# Patient Record
Sex: MALE | Race: WHITE | Employment: OTHER | URBAN - METROPOLITAN AREA
[De-identification: names, ages, dates, MRNs, and addresses within clinical notes are randomized per-mention and may not be internally consistent; named-entity substitution may affect disease eponyms.]

---

## 2019-03-14 ENCOUNTER — TRANSFERRED RECORDS (OUTPATIENT)
Dept: HEALTH INFORMATION MANAGEMENT | Facility: CLINIC | Age: 84
End: 2019-03-14

## 2019-09-09 ENCOUNTER — TRANSFERRED RECORDS (OUTPATIENT)
Dept: HEALTH INFORMATION MANAGEMENT | Facility: CLINIC | Age: 84
End: 2019-09-09

## 2019-09-23 ENCOUNTER — ALLIED HEALTH/NURSE VISIT (OUTPATIENT)
Dept: NEUROLOGY | Facility: CLINIC | Age: 84
End: 2019-09-23
Payer: COMMERCIAL

## 2019-09-23 ENCOUNTER — OFFICE VISIT (OUTPATIENT)
Dept: NEUROLOGY | Facility: CLINIC | Age: 84
End: 2019-09-23
Payer: COMMERCIAL

## 2019-09-23 VITALS
DIASTOLIC BLOOD PRESSURE: 70 MMHG | TEMPERATURE: 97.7 F | WEIGHT: 176 LBS | SYSTOLIC BLOOD PRESSURE: 135 MMHG | HEART RATE: 63 BPM

## 2019-09-23 DIAGNOSIS — G40.109 FOCAL EPILEPSY (H): Primary | ICD-10-CM

## 2019-09-23 DIAGNOSIS — R56.9 SEIZURE-LIKE ACTIVITY (H): Primary | ICD-10-CM

## 2019-09-23 RX ORDER — ATORVASTATIN CALCIUM 80 MG/1
80 TABLET, FILM COATED ORAL
COMMUNITY
Start: 2019-02-07

## 2019-09-23 RX ORDER — ACETAMINOPHEN 500 MG
500-1000 TABLET ORAL
COMMUNITY

## 2019-09-23 RX ORDER — AMLODIPINE BESYLATE 10 MG/1
10 TABLET ORAL
COMMUNITY
Start: 2019-02-08

## 2019-09-23 RX ORDER — CLOPIDOGREL BISULFATE 75 MG/1
75 TABLET ORAL
COMMUNITY

## 2019-09-23 RX ORDER — FAMOTIDINE 20 MG/1
20 TABLET, FILM COATED ORAL
COMMUNITY
Start: 2019-02-08

## 2019-09-23 RX ORDER — ALBUTEROL SULFATE 0.83 MG/ML
2.5 SOLUTION RESPIRATORY (INHALATION)
COMMUNITY
Start: 2015-08-26

## 2019-09-23 RX ORDER — ECHINACEA PURPUREA EXTRACT 125 MG
2 TABLET ORAL
COMMUNITY

## 2019-09-23 RX ORDER — METOPROLOL SUCCINATE 100 MG/1
50 TABLET, EXTENDED RELEASE ORAL
COMMUNITY

## 2019-09-23 RX ORDER — LACOSAMIDE 50 MG/1
50 TABLET ORAL
COMMUNITY
Start: 2019-09-09 | End: 2019-09-23

## 2019-09-23 RX ORDER — ASPIRIN 81 MG/1
81 TABLET ORAL
COMMUNITY
Start: 2017-07-31

## 2019-09-23 RX ORDER — TERAZOSIN 10 MG/1
1 CAPSULE ORAL
COMMUNITY

## 2019-09-23 RX ORDER — ISOSORBIDE MONONITRATE 60 MG/1
60 TABLET, EXTENDED RELEASE ORAL
COMMUNITY
Start: 2019-06-16

## 2019-09-23 RX ORDER — LEVETIRACETAM 250 MG/1
250 TABLET ORAL 2 TIMES DAILY
Qty: 60 TABLET | Refills: 2 | Status: SHIPPED | OUTPATIENT
Start: 2019-09-23 | End: 2020-03-13

## 2019-09-23 RX ORDER — NITROGLYCERIN 0.4 MG/1
0.4 TABLET SUBLINGUAL
COMMUNITY

## 2019-09-23 RX ORDER — HYDROCODONE BITARTRATE AND ACETAMINOPHEN 5; 325 MG/1; MG/1
1 TABLET ORAL
COMMUNITY
Start: 2019-01-30

## 2019-09-23 RX ORDER — FLUTICASONE PROPIONATE 50 MCG
2 SPRAY, SUSPENSION (ML) NASAL
COMMUNITY

## 2019-09-23 RX ORDER — BUDESONIDE AND FORMOTEROL FUMARATE DIHYDRATE 160; 4.5 UG/1; UG/1
2 AEROSOL RESPIRATORY (INHALATION)
COMMUNITY

## 2019-09-23 NOTE — LETTER
2019       RE: Davie Ayoub  : 1929   MRN: [id number]      Dear Colleague,    Thank you for referring your patient, Davie Ayoub, to the [facility] EPILEPSY CARE at [facility]. Please see a copy of my visit note below.    [facility] Physical       Patient:  Davie Ayoub  :  1929   Age:  89 year old   Today's Office Visit:  2019    Referring Provider:    Jose Raul Pham MD  [facility]  [address] [place] [address]    History of Present Illness:    Davie Ayoub is a 89 yo male with history of multiple co-morbidities including prior CVA, L vertebral stenosis and R vertebral stent who presents for follow up after recent hospitalization for dysarthria. Patient states he woke up at 4am on  with discomfort and numbness in L arm. Wife woke up at 7am and says he had difficulty talking and his speech was garbled. Daughter says by 9am when she arrived, his speech was normal. He was following commands and was fully aware at the time. No loss of consciousness. No tongue biting. His MRI did not reveal an acute infarct. Given that he had a reported history of seizures, there was concern for TIA vs seizure. He was started on Vimpat 50 mg BID and discharged with plan for follow up in neuro clinic.  He has been having episodic speech issues for ~10 yrs now. He describes they last a few hours and fully resolve. Possibly associated with loss of balance (though this sounds more chronic and related to vertebral stenosis). He has had 2-3 episodes in 2019.  About a year ago, he had similar symptoms with difficulty speaking for 9-10 hours, after which he was confused/agitated.   He states the possibility of seizures was first mentioned in 2017 - he presented with dysarthria, generalized weakness and loss of balance which was thought to be a stroke so he was given TPA. His symptoms fully resolved and his MRI did not show  any acute infarct. He was initially discharged but returned with similar symptoms and was admitted to [place]. There was concern for seizures as he reported having similar prior episodes for few years. He was placed on continuous EEG at that time - which revealed mild slowing in R posterior region but no events were recorded.  He was ultimately discharged on keppra 750 BID. Patient reports this medication was stopped due to excessive sleepiness.  Epilepsy Risk Factors:  No meningitis, childhood seizures, family history of epilepsy, or other major risk factors. No recent trauma; though he states 5 yrs ago he had a fall and hit his head - no LOC. History of right occipital stroke.   Precipitating factors:   NONE    Social History     Socioeconomic History     Marital status:      Spouse name: None     Number of children: None     Years of education: None     Highest education level: None   Occupational History     None   Social Needs     Financial resource strain: None     Food insecurity:     Worry: None     Inability: None     Transportation needs:     Medical: None     Non-medical: None   Tobacco Use     Smoking status: Never Smoker     Smokeless tobacco: Never Used   Substance and Sexual Activity     Alcohol use: None     Drug use: None     Sexual activity: None   Lifestyle     Physical activity:     Days per week: None     Minutes per session: None     Stress: None   Relationships     Social connections:     Talks on phone: None     Gets together: None     Attends Rastafari service: None     Active member of club or organization: None     Attends meetings of clubs or organizations: None     Relationship status: None     Intimate partner violence:     Fear of current or ex partner: None     Emotionally abused: None     Physically abused: None     Forced sexual activity: None   Other Topics Concern     Parent/sibling w/ CABG, MI or angioplasty before 65F 55M? Not Asked   Social History Narrative     None      Past Medical History:   1. Right occipital stroke.  2. Macular degeneration.  3. BPH  4. Essential HTN  5. CAD  7. HLD  8. NSTEMI  9. L4/L5 laminectomy  Employment/School:  Retired  Driving:  Currently patient is:  Driving: Patient was made aware of state driving laws. Currently meets criteria to continue to drive.    Previous Evaluations for Epilepsy:   EEG (7/30/17 at OSH):  Background: Background activity during wakefulness reveals a 9-10Hz posterior dominant rhythm. It appears symmetrical, synchronous, and reactive to eye opening. Lower amplitude frequencies are noted over the frontal fields creating an appropriate anterior-posterior amplitude-frequency gradient. Mild slowing is seen in right posterior (Max P8) region, especially during drowsiness.   Sleep: Stage I and stage II of sleep were identified. Stage II sleep is notable for symmetric sleep spindles, vertex sharp transients, and K complexes. Slow wave sleep was also seen, and is characterized by a mixture of delta and theta frequencies.   EKG: A normal sinus rhythm is noted with an estimated heart rate of 66-72 bpm.  Ictal/Interictal Findings: None.  Impression: Mildly abnormal - Mild right posterior slowing  Clinical Correlation: This EEG demonstrates focal abnormalities of the right posterior region. Focal abnormalities imply localized cortical dysfunction and may be related to structural, vascular, or other epileptogenic pathologies. In this case, likely secondary to patient's known old stroke. Clinical correlation is recommended.  EEG (9/23/19 prior to visit): Some focal epilepiform discharges in L temporal region with generalized slowing.  MRA Head (9/9/19):  Atheromatous change and mild narrowing posterior cerebral arteries, right greater than left but no significant vessel occlusion seen. Anterior circulation unremarkable.  MRA Neck (9/9/19):  1. Left vertebral artery is only faintly visualized segmentally and suspect multilevel stenoses.  2.  Possible stenosis origin right vertebral artery. Remainder of the right vertebral artery is unremarkable.  3. No significant stenosis in the anterior circulation in the neck. Mild atheromatous change.  MRI of Brain (9/9/19): Nothing acute. Old right occipital lobe infarct, mild chronic small vessel ischemic disease, and minimal atrophy.  Review of Systems:  Lethargy / Tiredness:  No  Nausea / Vomiting:  No  Double Vision:  No  Sleepiness:  No  Depression:  No  Slowed Cognitive Function:  No  Memory Problems:  No  Poor Balance:  Yes  Dizziness:  Yes  Appetite Changes:  No  Blurred Vision:  No  Decreased Libido:  No  Sleep Changes:  No  Behavioral Changes:  No  Skin: negative  Respiratory: No cough, or hemoptysis  Cardiovascular: negative  Have you experienced a traumatic fall related to your events: No  Are these falls related to your seizures: No    Current Outpatient Medications   Medication Sig Dispense Refill     acetaminophen (TYLENOL) 500 MG tablet Take 500-1,000 mg by mouth       albuterol (PROVENTIL) (2.5 MG/3ML) 0.083% neb solution Inhale 2.5 mg into the lungs       amLODIPine (NORVASC) 10 MG tablet Take 10 mg by mouth       aspirin 81 MG EC tablet Take 81 mg by mouth       atorvastatin (LIPITOR) 80 MG tablet Take 80 mg by mouth       budesonide-formoterol (SYMBICORT) 160-4.5 MCG/ACT Inhaler Inhale 2 puffs into the lungs       clopidogrel (PLAVIX) 75 MG tablet Take 75 mg by mouth       famotidine (PEPCID) 20 MG tablet Take 20 mg by mouth       fluticasone (FLONASE) 50 MCG/ACT nasal spray Spray 2 sprays in nostril       HYDROcodone-acetaminophen (NORCO) 5-325 MG tablet Take 1 tablet by mouth       isosorbide mononitrate (IMDUR) 60 MG 24 hr tablet Take 60 mg by mouth       levETIRAcetam (KEPPRA) 250 MG tablet Take 1 tablet (250 mg) by mouth 2 times daily 60 tablet 2     metoprolol succinate ER (TOPROL-XL) 100 MG 24 hr tablet Take 50 mg by mouth       nitroGLYcerin (NITROSTAT) 0.4 MG sublingual tablet Place 0.4  mg under the tongue       sodium chloride (OCEAN) 0.65 % nasal spray Spray 2 sprays in nostril       terazosin (HYTRIN) 10 MG capsule Take 1 capsule by mouth         Perceived AED Side Effects: No    Medication Notes:   AED Medication Compliance:  compliant all of the time  Using a pill box:  No    Past AEDs:  Keppra 750 BID - made him drowsy    Exam:    /70 (BP Location: Right arm, Patient Position: Chair, Cuff Size: Adult Regular)   Pulse 63   Temp 97.7  F (36.5  C)   Wt 79.8 kg (176 lb)      Wt Readings from Last 5 Encounters:   09/23/19 79.8 kg (176 lb)   General: Normal male, not in acute distress  HEENT: NC/AT, no nasal drainage, no oral ulcers  Chest: No respiratory distress  Heart: Regular rate  Extremities: Missing fingers in R hand, warm, well perfused  Neuro:  -MS: alert and oriented, speech fluent and coherent  -CN: VFF, EOMI with smooth pursuit, facial sensation and movement intact, hearing intact to conversation, palate raises symmetrically, shoulder shrug strong bilaterally, tongue midline  -Motor: No abnormal movements. Tone normal. Strength 5/5 throughout bilateral upper/lower extremities.  -Reflexes: Normoactive and symmetric at achilles, patella, biceps, brachioradialis.  -Sensory: Decreased to light touch in RLE, otherwise equal and intact.  -Coordination: FNF and HS intact bilaterally  -Gait: Uses a walker, not formally tested    Assessment and Plan:  This is a 89 yo male with history of multiple co-morbidities including prior CVA, L vertebral stenosis and R vertebral stent who presents with multiple prior episodes of dysarthria, lasting hours. When seen in the hospital, concern for TIA vs seizure. In 2017, admitted for similar symptoms with continuous EEG revealing mild R posterior slowing. EEG today reveals epileptiform discharges in L temporal lobe. At this time, based on his EEG, he is at risk for seizures, though we did not capture any spells to determine if his spells are  seizures. Spells lasting hours is not entirely typical for seizures. However, will recommend he continue AEDs. Unfortunately, it seems his coverage will be limited with Vimpat. He had tried keppra 750 BID in the past but stated it made him very sleepy. He is agreeable to taking keppra 250 BID instead - which will be covered by insurance.    - Taper Vimpat to off.  - Start Keppra 250 BID. If he is still feeling tired after 1-2 weeks, can take Keppra 500 at night only.  - Obtain Keppra level in 2 weeks for efficacy, will do adjustments accordingly.   - Follow up in 2 months.    Patient was seen and discussed with attending, Dr. Núñez.    Mary Salazar MD  Neurology PGY2    I saw and evaluated Mr. Davie Ayoub and discussed the plan of care with the resident. I have reviewed her note and agree with the findings, impression and plan as edited above. Patient's presentation with gradual progressing symptoms from left arm numbness to difficulty talking over hours is unusual for seizures. Symptoms were more likely to be stroke/TIAs and the patient has risk factors for stroke and has h/o right occipital stroke. However, patient's EEG shows rare left temporal (T3/T5) epileptiform discharges. Therefore, the patient was recommended to continue an antiepileptic medication. Due to the limited coverage for Vimpat it was decided to switch to low dose Keppra.     Velma Swann MD          Again, thank you for allowing me to participate in the care of your patient.      Sincerely,    Velma Swann MD

## 2019-09-23 NOTE — PROGRESS NOTES
CPT: 16102-80  [facility]  [facility]  Dr Wilton escalante

## 2019-09-23 NOTE — PROGRESS NOTES
[facility] and Physical       Patient:  Davie Ayoub  :  1929   Age:  89 year old   Today's Office Visit:  2019    Referring Provider:    Jose Raul Pham MD  [place] [address] [place] [address]    History of Present Illness:    Davie Ayoub is a 89 yo male with history of multiple co-morbidities including prior CVA, L vertebral stenosis and R vertebral stent who presents for follow up after recent hospitalization for dysarthria. Patient states he woke up at 4am on  with discomfort and numbness in L arm. Wife woke up at 7am and says he had difficulty talking and his speech was garbled. Daughter says by 9am when she arrived, his speech was normal. He was following commands and was fully aware at the time. No loss of consciousness. No tongue biting. His MRI did not reveal an acute infarct. Given that he had a reported history of seizures, there was concern for TIA vs seizure. He was started on Vimpat 50 mg BID and discharged with plan for follow up in neuro clinic.  He has been having episodic speech issues for ~10 yrs now. He describes they last a few hours and fully resolve. Possibly associated with loss of balance (though this sounds more chronic and related to vertebral stenosis). He has had 2-3 episodes in 2019.  About a year ago, he had similar symptoms with difficulty speaking for 9-10 hours, after which he was confused/agitated.   He states the possibility of seizures was first mentioned in 2017 - he presented with dysarthria, generalized weakness and loss of balance which was thought to be a stroke so he was given TPA. His symptoms fully resolved and his MRI did not show any acute infarct. He was initially discharged but returned with similar symptoms and was admitted to [place]. There was concern for seizures as he reported having similar prior episodes for few years. He was placed on continuous EEG at that time - which revealed mild  slowing in R posterior region but no events were recorded.  He was ultimately discharged on keppra 750 BID. Patient reports this medication was stopped due to excessive sleepiness.  Epilepsy Risk Factors:  No meningitis, childhood seizures, family history of epilepsy, or other major risk factors. No recent trauma; though he states 5 yrs ago he had a fall and hit his head - no LOC. History of right occipital stroke.   Precipitating factors:   NONE    Social History     Socioeconomic History     Marital status:      Spouse name: None     Number of children: None     Years of education: None     Highest education level: None   Occupational History     None   Social Needs     Financial resource strain: None     Food insecurity:     Worry: None     Inability: None     Transportation needs:     Medical: None     Non-medical: None   Tobacco Use     Smoking status: Never Smoker     Smokeless tobacco: Never Used   Substance and Sexual Activity     Alcohol use: None     Drug use: None     Sexual activity: None   Lifestyle     Physical activity:     Days per week: None     Minutes per session: None     Stress: None   Relationships     Social connections:     Talks on phone: None     Gets together: None     Attends Orthodox service: None     Active member of club or organization: None     Attends meetings of clubs or organizations: None     Relationship status: None     Intimate partner violence:     Fear of current or ex partner: None     Emotionally abused: None     Physically abused: None     Forced sexual activity: None   Other Topics Concern     Parent/sibling w/ CABG, MI or angioplasty before 65F 55M? Not Asked   Social History Narrative     None     Past Medical History:   1. Right occipital stroke.  2. Macular degeneration.  3. BPH  4. Essential HTN  5. CAD  7. HLD  8. NSTEMI  9. L4/L5 laminectomy  Employment/School:  Retired  Driving:  Currently patient is:  Driving: Patient was made aware of state driving  laws. Currently meets criteria to continue to drive.    Previous Evaluations for Epilepsy:   EEG (7/30/17 at OSH):  Background: Background activity during wakefulness reveals a 9-10Hz posterior dominant rhythm. It appears symmetrical, synchronous, and reactive to eye opening. Lower amplitude frequencies are noted over the frontal fields creating an appropriate anterior-posterior amplitude-frequency gradient. Mild slowing is seen in right posterior (Max P8) region, especially during drowsiness.   Sleep: Stage I and stage II of sleep were identified. Stage II sleep is notable for symmetric sleep spindles, vertex sharp transients, and K complexes. Slow wave sleep was also seen, and is characterized by a mixture of delta and theta frequencies.   EKG: A normal sinus rhythm is noted with an estimated heart rate of 66-72 bpm.  Ictal/Interictal Findings: None.  Impression: Mildly abnormal - Mild right posterior slowing  Clinical Correlation: This EEG demonstrates focal abnormalities of the right posterior region. Focal abnormalities imply localized cortical dysfunction and may be related to structural, vascular, or other epileptogenic pathologies. In this case, likely secondary to patient's known old stroke. Clinical correlation is recommended.  EEG (9/23/19 prior to visit): Some focal epilepiform discharges in L temporal region with generalized slowing.  MRA Head (9/9/19):  Atheromatous change and mild narrowing posterior cerebral arteries, right greater than left but no significant vessel occlusion seen. Anterior circulation unremarkable.  MRA Neck (9/9/19):  1. Left vertebral artery is only faintly visualized segmentally and suspect multilevel stenoses.  2. Possible stenosis origin right vertebral artery. Remainder of the right vertebral artery is unremarkable.  3. No significant stenosis in the anterior circulation in the neck. Mild atheromatous change.  MRI of Brain (9/9/19): Nothing acute. Old right occipital lobe  infarct, mild chronic small vessel ischemic disease, and minimal atrophy.  Review of Systems:  Lethargy / Tiredness:  No  Nausea / Vomiting:  No  Double Vision:  No  Sleepiness:  No  Depression:  No  Slowed Cognitive Function:  No  Memory Problems:  No  Poor Balance:  Yes  Dizziness:  Yes  Appetite Changes:  No  Blurred Vision:  No  Decreased Libido:  No  Sleep Changes:  No  Behavioral Changes:  No  Skin: negative  Respiratory: No cough, or hemoptysis  Cardiovascular: negative  Have you experienced a traumatic fall related to your events: No  Are these falls related to your seizures: No    Current Outpatient Medications   Medication Sig Dispense Refill     acetaminophen (TYLENOL) 500 MG tablet Take 500-1,000 mg by mouth       albuterol (PROVENTIL) (2.5 MG/3ML) 0.083% neb solution Inhale 2.5 mg into the lungs       amLODIPine (NORVASC) 10 MG tablet Take 10 mg by mouth       aspirin 81 MG EC tablet Take 81 mg by mouth       atorvastatin (LIPITOR) 80 MG tablet Take 80 mg by mouth       budesonide-formoterol (SYMBICORT) 160-4.5 MCG/ACT Inhaler Inhale 2 puffs into the lungs       clopidogrel (PLAVIX) 75 MG tablet Take 75 mg by mouth       famotidine (PEPCID) 20 MG tablet Take 20 mg by mouth       fluticasone (FLONASE) 50 MCG/ACT nasal spray Spray 2 sprays in nostril       HYDROcodone-acetaminophen (NORCO) 5-325 MG tablet Take 1 tablet by mouth       isosorbide mononitrate (IMDUR) 60 MG 24 hr tablet Take 60 mg by mouth       levETIRAcetam (KEPPRA) 250 MG tablet Take 1 tablet (250 mg) by mouth 2 times daily 60 tablet 2     metoprolol succinate ER (TOPROL-XL) 100 MG 24 hr tablet Take 50 mg by mouth       nitroGLYcerin (NITROSTAT) 0.4 MG sublingual tablet Place 0.4 mg under the tongue       sodium chloride (OCEAN) 0.65 % nasal spray Spray 2 sprays in nostril       terazosin (HYTRIN) 10 MG capsule Take 1 capsule by mouth         Perceived AED Side Effects: No    Medication Notes:   AED Medication Compliance:  compliant all of  the time  Using a pill box:  No    Past AEDs:  Keppra 750 BID - made him drowsy    Exam:    /70 (BP Location: Right arm, Patient Position: Chair, Cuff Size: Adult Regular)   Pulse 63   Temp 97.7  F (36.5  C)   Wt 79.8 kg (176 lb)      Wt Readings from Last 5 Encounters:   09/23/19 79.8 kg (176 lb)   General: Normal male, not in acute distress  HEENT: NC/AT, no nasal drainage, no oral ulcers  Chest: No respiratory distress  Heart: Regular rate  Extremities: Missing fingers in R hand, warm, well perfused  Neuro:  -MS: alert and oriented, speech fluent and coherent  -CN: VFF, EOMI with smooth pursuit, facial sensation and movement intact, hearing intact to conversation, palate raises symmetrically, shoulder shrug strong bilaterally, tongue midline  -Motor: No abnormal movements. Tone normal. Strength 5/5 throughout bilateral upper/lower extremities.  -Reflexes: Normoactive and symmetric at achilles, patella, biceps, brachioradialis.  -Sensory: Decreased to light touch in RLE, otherwise equal and intact.  -Coordination: FNF and HS intact bilaterally  -Gait: Uses a walker, not formally tested    Assessment and Plan:  This is a 89 yo male with history of multiple co-morbidities including prior CVA, L vertebral stenosis and R vertebral stent who presents with multiple prior episodes of dysarthria, lasting hours. When seen in the hospital, concern for TIA vs seizure. In 2017, admitted for similar symptoms with continuous EEG revealing mild R posterior slowing. EEG today reveals epileptiform discharges in L temporal lobe. At this time, based on his EEG, he is at risk for seizures, though we did not capture any spells to determine if his spells are seizures. Spells lasting hours is not entirely typical for seizures. However, will recommend he continue AEDs. Unfortunately, it seems his coverage will be limited with Vimpat. He had tried keppra 750 BID in the past but stated it made him very sleepy. He is agreeable to  taking keppra 250 BID instead - which will be covered by insurance.    - Taper Vimpat to off.  - Start Keppra 250 BID. If he is still feeling tired after 1-2 weeks, can take Keppra 500 at night only.  - Obtain Keppra level in 2 weeks for efficacy, will do adjustments accordingly.   - Follow up in 2 months.    Patient was seen and discussed with attending, Dr. Núñez.    Mary Salazar MD  Neurology PGY2    I saw and evaluated Mr. Davie Ayoub and discussed the plan of care with the resident. I have reviewed her note and agree with the findings, impression and plan as edited above. Patient's presentation with gradual progressing symptoms from left arm numbness to difficulty talking over hours is unusual for seizures. Symptoms were more likely to be stroke/TIAs and the patient has risk factors for stroke and has h/o right occipital stroke. However, patient's EEG shows rare left temporal (T3/T5) epileptiform discharges. Therefore, the patient was recommended to continue an antiepileptic medication. Due to the limited coverage for Vimpat it was decided to switch to low dose Keppra.     Velma Swann MD

## 2019-10-02 NOTE — PROCEDURES
Procedure Date: 2019      EEG #:  [id number].      DATE OF STUDY:  2019 .      SOURCE FILE DURATION:  3 hours 12 minutes 53 seconds.      CLINICAL SUMMARY:  The patient is a 89-year-old male with episodes of aphasia and left arm numbness.  EEG was performed to evaluate for seizures.     TECHNICAL SUMMARY: This continuous video- EEG monitoring procedure was performed with 23 scalp electrodes in 10-20 electrode system placement, and additional scalp, precordial and other surface electrodes used for electrical referencing and artifact detection.  Video monitoring was utilized and periodically reviewed by EEG technologists and the physician for electroclinical correlations.     INTERICTAL ACTIVITIES:  During quiet wakefulness, there was poorly sustained 7 Hz activity over the posterior head regions which was symmetric and reactive.  Diffuse theta slowing was present during waking.  Vertex waves and symmetric sleep spindles were seen during sleep.  Rare sharp waves were present over the left posterior temporal region, maximal at T3-T5.      ACTIVATION MANEUVERS:  Photic stimulation did not induce an abnormal activity on the EEG.      CLINICAL/ICTAL EVENTS:  No electrographic or clinical seizures were recorded.      IMPRESSION:  This is an abnormal video EEG due to the presence of diffuse theta slowing during waking, consistent with mild diffuse nonspecific encephalopathy.  There were also rare epileptiform discharges over the left mid-posterior temporal region consistent with an area of irritability and tendency for seizures.  No electrographic or clinical seizures were recorded.         GRISEL NESS MD             D: 10/01/2019   T: 10/01/2019   MT: al      Name:     ADELINA CERDA   MRN:      [id number]        Account:        [id number]   :      1929           Procedure Date: 2019      Document: [id number]

## 2019-11-11 ENCOUNTER — OFFICE VISIT (OUTPATIENT)
Dept: NEUROLOGY | Facility: CLINIC | Age: 84
End: 2019-11-11
Payer: COMMERCIAL

## 2019-11-11 VITALS — WEIGHT: 180.8 LBS | HEART RATE: 60 BPM | SYSTOLIC BLOOD PRESSURE: 128 MMHG | DIASTOLIC BLOOD PRESSURE: 84 MMHG

## 2019-11-11 DIAGNOSIS — R40.4 NONSPECIFIC PAROXYSMAL SPELL: Primary | ICD-10-CM

## 2019-11-11 RX ORDER — LACOSAMIDE 100 MG/1
TABLET ORAL
Qty: 45 TABLET | Refills: 5 | Status: SHIPPED | OUTPATIENT
Start: 2019-11-11

## 2019-11-11 NOTE — LETTER
"2019       RE: Davie Ayoub  : 1929   MRN: [id number]      Dear Colleague,    Thank you for referring your patient, Davie Ayoub, to the [facility] EPILEPSY CARE at [facility]. Please see a copy of my visit note below.    [facility] Epilepsy Care Progress Note      Patient:  Davie Ayoub  :  1929   Age:  89 year old   Today's Office Visit:  2019    History of Present Illness:   Davie Ayoub is a 89yo M w/ pmh co-mobidties including prior CVA, L vertebral stenosis and R vertebral stent who was last seen in Neurology Clinic by Dr. Núñez on 2019 for episodes of garbled, dysarthric speech for ~10yrs, last  up to a few hours and fully resolve. He has had previous workup w/ EEGs showing R posterior slowing and most recent study revealed epileptiform discharges from L temporal lobe. Given  EEG abnormalities and ambiguity in history, patient was considered at risk for seizures and therefore was to continue on ASDs. Please see previous notes for more information.     Interval History:  Patient had not tolerated keppra previously due to lethargy and was on Vimpat, although this was seemingly not going to be covered so the plan at last visit was to continue Keppra at a lower dose. Today, patient presents with wife and daughter; they say that Vimpat was ultimately approved and has uptitrated to 100mg BID for about a month now. Has not had any more spells in this time but does say that he's been sleepy all the time, doesn't have \"the balance as before\" and was considering quitting color guard because of these symptoms. Correlates these symptoms with increasing vimpat to 100 BID but denies symptoms at 50mg BID. Denies falls, doesn't use a cane or walker, although wife thinks he should. Uses a pill box, never misses a dose.     Other events - wife says that 2 weeks ago he was \"slurring, moaning, groaning\" in his sleep; no other associated seizure-like symptoms. Woke " up and was talking normal. Reports he gets light headed when he stands up too fast or when he turns his head to quick.       Current Outpatient Medications   Medication Sig Dispense Refill     acetaminophen (TYLENOL) 500 MG tablet Take 500-1,000 mg by mouth       albuterol (PROVENTIL) (2.5 MG/3ML) 0.083% neb solution Inhale 2.5 mg into the lungs       amLODIPine (NORVASC) 10 MG tablet Take 10 mg by mouth       aspirin 81 MG EC tablet Take 81 mg by mouth       atorvastatin (LIPITOR) 80 MG tablet Take 80 mg by mouth       budesonide-formoterol (SYMBICORT) 160-4.5 MCG/ACT Inhaler Inhale 2 puffs into the lungs       clopidogrel (PLAVIX) 75 MG tablet Take 75 mg by mouth       famotidine (PEPCID) 20 MG tablet Take 20 mg by mouth       fluticasone (FLONASE) 50 MCG/ACT nasal spray Spray 2 sprays in nostril       HYDROcodone-acetaminophen (NORCO) 5-325 MG tablet Take 1 tablet by mouth       isosorbide mononitrate (IMDUR) 60 MG 24 hr tablet Take 60 mg by mouth       levETIRAcetam (KEPPRA) 250 MG tablet Take 1 tablet (250 mg) by mouth 2 times daily 60 tablet 2     metoprolol succinate ER (TOPROL-XL) 100 MG 24 hr tablet Take 50 mg by mouth       nitroGLYcerin (NITROSTAT) 0.4 MG sublingual tablet Place 0.4 mg under the tongue       sodium chloride (OCEAN) 0.65 % nasal spray Spray 2 sprays in nostril       terazosin (HYTRIN) 10 MG capsule Take 1 capsule by mouth          Medication Notes:        AED Medication Compliance:  compliant all of the time  Using a pill box:  Yes    Review of Systems:  10pt review of system performed and negative besides what was listed in HPI.    Exam:    /84 (BP Location: Left arm, Patient Position: Sitting, Cuff Size: Adult Regular)   Pulse 60   Wt 180 lb 12.8 oz (82 kg)      Wt Readings from Last 5 Encounters:   11/11/19 180 lb 12.8 oz (82 kg)   09/23/19 176 lb (79.8 kg)     General Appearance: Normal  Gait: few steps unassisted appears unstable, deferred rest of eval.  Attention Span:   Normal  Language:  Normal  Pupils: unequal, R>>L, L minimally reactive, no convincing constriction of R w/ ambient light.   Extraocular Movements:  Normal  Coordination:  Normal  Visual Fields:  Normal, question of upper fields but likely 2/2 drooping eyelid and not VF-related.  Facial Sensation:  Normal  Facial Strength: R nasolabial flattening/smile asymmetry. L eyelid drooping.  Tongue Strength:  Normal  Limb Strength:  Normal  Limb Tone:  Normal  Limb Sensation:  Normal except for decreased sensation on RLE.   Reflexes: Brisk in RUE compared to L, more notable than RLE > LLE.   General Physical Findings:  Normal  CV: Regular rate  Pulm: CTAB  Abd: NT/ND, BS+  Extremities: Missing fingers in R hand, warm, well perfused    Assessment and Plan:   89 yo male with history of multiple co-morbidities including prior CVA, L vertebral stenosis and R vertebral stent who presents with multiple prior episodes of dysarthria, lasting hours. Differential includes vascular phenomena in setting of known intracerebral pathology, medication side effect, seizure. Episodes as described would be atypical of seizures. Alternatively, given ambiguity in reported history and EEG 9/2019 revealing epileptiform discharges in L temporal lobe and therefore susceptibility for seizures, it may be appropriate to continue ASDs for the time being. Will continue Vimpat at a lower dose to optimize treatment while attempting to minimize side effects.    Recommendations:  - Continue Vimpat    - check level after 1 week  - f/u 4 months    Patient was seen and discussed with my attending, Dr. Núñez, who agrees with the assessment and plan as described as above.    Asim Dennis MD on 11/11/2019 at 4:39 PM      I saw and evaluated the patient and discussed the plan of care with Dr. Dennis. I have reviewed his note and agree with the findings, impression and plan.   Velma Swann MD                      Again, thank you for allowing me  to participate in the care of your patient.      Sincerely,    Velma Swann MD

## 2019-11-11 NOTE — PROGRESS NOTES
"[facility]      Patient:  Davie Ayoub  :  1929   Age:  89 year old   Today's Office Visit:  2019    History of Present Illness:   Davie Ayoub is a 89yo M w/ pmh co-mobidties including prior CVA, L vertebral stenosis and R vertebral stent who was last seen in Neurology Clinic by Dr. Núñez on 2019 for episodes of garbled, dysarthric speech for ~10yrs, last  up to a few hours and fully resolve. He has had previous workup w/ EEGs showing R posterior slowing and most recent study revealed epileptiform discharges from L temporal lobe. Given  EEG abnormalities and ambiguity in history, patient was considered at risk for seizures and therefore was to continue on ASDs. Please see previous notes for more information.     Interval History:  Patient had not tolerated keppra previously due to lethargy and was on Vimpat, although this was seemingly not going to be covered so the plan at last visit was to continue Keppra at a lower dose. Today, patient presents with wife and daughter; they say that Vimpat was ultimately approved and has uptitrated to 100mg BID for about a month now. Has not had any more spells in this time but does say that he's been sleepy all the time, doesn't have \"the balance as before\" and was considering quitting color guard because of these symptoms. Correlates these symptoms with increasing vimpat to 100 BID but denies symptoms at 50mg BID. Denies falls, doesn't use a cane or walker, although wife thinks he should. Uses a pill box, never misses a dose.     Other events - wife says that 2 weeks ago he was \"slurring, moaning, groaning\" in his sleep; no other associated seizure-like symptoms. Woke up and was talking normal. Reports he gets light headed when he stands up too fast or when he turns his head to quick.       Current Outpatient Medications   Medication Sig Dispense Refill     acetaminophen (TYLENOL) 500 MG tablet Take 500-1,000 mg by mouth       " albuterol (PROVENTIL) (2.5 MG/3ML) 0.083% neb solution Inhale 2.5 mg into the lungs       amLODIPine (NORVASC) 10 MG tablet Take 10 mg by mouth       aspirin 81 MG EC tablet Take 81 mg by mouth       atorvastatin (LIPITOR) 80 MG tablet Take 80 mg by mouth       budesonide-formoterol (SYMBICORT) 160-4.5 MCG/ACT Inhaler Inhale 2 puffs into the lungs       clopidogrel (PLAVIX) 75 MG tablet Take 75 mg by mouth       famotidine (PEPCID) 20 MG tablet Take 20 mg by mouth       fluticasone (FLONASE) 50 MCG/ACT nasal spray Spray 2 sprays in nostril       HYDROcodone-acetaminophen (NORCO) 5-325 MG tablet Take 1 tablet by mouth       isosorbide mononitrate (IMDUR) 60 MG 24 hr tablet Take 60 mg by mouth       levETIRAcetam (KEPPRA) 250 MG tablet Take 1 tablet (250 mg) by mouth 2 times daily 60 tablet 2     metoprolol succinate ER (TOPROL-XL) 100 MG 24 hr tablet Take 50 mg by mouth       nitroGLYcerin (NITROSTAT) 0.4 MG sublingual tablet Place 0.4 mg under the tongue       sodium chloride (OCEAN) 0.65 % nasal spray Spray 2 sprays in nostril       terazosin (HYTRIN) 10 MG capsule Take 1 capsule by mouth          Medication Notes:        AED Medication Compliance:  compliant all of the time  Using a pill box:  Yes    Review of Systems:  10pt review of system performed and negative besides what was listed in HPI.    Exam:    /84 (BP Location: Left arm, Patient Position: Sitting, Cuff Size: Adult Regular)   Pulse 60   Wt 180 lb 12.8 oz (82 kg)      Wt Readings from Last 5 Encounters:   11/11/19 180 lb 12.8 oz (82 kg)   09/23/19 176 lb (79.8 kg)     General Appearance: Normal  Gait: few steps unassisted appears unstable, deferred rest of eval.  Attention Span:  Normal  Language:  Normal  Pupils: unequal, R>>L, L minimally reactive, no convincing constriction of R w/ ambient light.   Extraocular Movements:  Normal  Coordination:  Normal  Visual Fields:  Normal, question of upper fields but likely 2/2 drooping eyelid and not  VF-related.  Facial Sensation:  Normal  Facial Strength: R nasolabial flattening/smile asymmetry. L eyelid drooping.  Tongue Strength:  Normal  Limb Strength:  Normal  Limb Tone:  Normal  Limb Sensation:  Normal except for decreased sensation on RLE.   Reflexes: Brisk in RUE compared to L, more notable than RLE > LLE.   General Physical Findings:  Normal  CV: Regular rate  Pulm: CTAB  Abd: NT/ND, BS+  Extremities: Missing fingers in R hand, warm, well perfused    Assessment and Plan:   89 yo male with history of multiple co-morbidities including prior CVA, L vertebral stenosis and R vertebral stent who presents with multiple prior episodes of dysarthria, lasting hours. Differential includes vascular phenomena in setting of known intracerebral pathology, medication side effect, seizure. Episodes as described would be atypical of seizures. Alternatively, given ambiguity in reported history and EEG 9/2019 revealing epileptiform discharges in L temporal lobe and therefore susceptibility for seizures, it may be appropriate to continue ASDs for the time being. Will continue Vimpat at a lower dose to optimize treatment while attempting to minimize side effects.    Recommendations:  - Continue Vimpat    - check level after 1 week  - f/u 4 months    Patient was seen and discussed with my attending, Dr. Núñez, who agrees with the assessment and plan as described as above.    Asim Dennis MD on 11/11/2019 at 4:39 PM      I saw and evaluated the patient and discussed the plan of care with Dr. Dennis. I have reviewed his note and agree with the findings, impression and plan.   Velma Swann MD

## 2020-03-13 ENCOUNTER — OFFICE VISIT (OUTPATIENT)
Dept: NEUROLOGY | Facility: CLINIC | Age: 85
End: 2020-03-13
Payer: COMMERCIAL

## 2020-03-13 VITALS
SYSTOLIC BLOOD PRESSURE: 128 MMHG | DIASTOLIC BLOOD PRESSURE: 59 MMHG | OXYGEN SATURATION: 97 % | HEART RATE: 72 BPM | WEIGHT: 181.5 LBS

## 2020-03-13 DIAGNOSIS — R40.4 NONSPECIFIC PAROXYSMAL SPELL: ICD-10-CM

## 2020-03-13 DIAGNOSIS — G40.109 FOCAL EPILEPSY (H): Primary | ICD-10-CM

## 2020-03-13 PROCEDURE — 99213 OFFICE O/P EST LOW 20 MIN: CPT | Performed by: PSYCHIATRY & NEUROLOGY

## 2020-03-13 ASSESSMENT — PAIN SCALES - GENERAL: PAINLEVEL: NO PAIN (0)

## 2020-03-13 NOTE — NURSING NOTE
Davie Ayoub's goals for this visit include:   Chief Complaint   Patient presents with     RECHECK     4 month follow up, accompanied by wife Ava       He requests these members of his care team be copied on today's visit information: no    PCP: Fior Nick    Referring Provider:  Velma Swann MD  [address] [place] [address]    /59 (BP Location: Left arm, Patient Position: Sitting, Cuff Size: Adult Regular)   Pulse 72   Wt 82.3 kg (181 lb 8 oz)   SpO2 97%     Do you need any medication refills at today's visit? no

## 2020-03-13 NOTE — LETTER
3/13/2020         RE: Davie Ayoub  [address] [address]        Dear Colleague,    Thank you for referring your patient, Davie Ayoub, to the [facility]. Please see a copy of my visit note below.     NEUROLOGY PROGRESS NOTE   [facility]    Patient:Davie Ayoub  : 1929  Age: 89 year old  Today's Office Visit: 2020    Background History:     Davie Ayoub is a 89yo M w/ pmh co-mobidties including prior CVA, L vertebral stenosis and R vertebral stent who was last seen on 2019 for episodes of garbled, dysarthric speech for ~10yrs, last  up to a few hours and fully resolve. He has had previous workup w/ EEGs showing R posterior slowing and most recent study revealed epileptiform discharges from L temporal lobe. Given  EEG abnormalities and ambiguity in history, patient was considered at risk for seizures and therefore was to continue on ASDs. Keppra caused too much sleepiness, so it was discontinued. Please see previous notes for more information.     History of present illness:     Neal is accompanied by his wife for a follow up on his seizures. He is taking lacosamide 50 mg am and 100 mg pm. He was very sleepy and had imbalance on Vimpat 100 mg bid. They did not notice any seizures since last visit in 2019. His wife says he dreams and talks and moves around in his sleep. He wakes up afterward and when she talks to him, he speaks fine and is not confused.    He saw his cardiologist yesterday and was told everything looked good.     Current Outpatient Medications   Medication Sig Dispense Refill     acetaminophen (TYLENOL) 500 MG tablet Take 500-1,000 mg by mouth       albuterol (PROVENTIL) (2.5 MG/3ML) 0.083% neb solution Inhale 2.5 mg into the lungs       amLODIPine (NORVASC) 10 MG tablet Take 10 mg by mouth       aspirin 81 MG EC tablet Take 81 mg by mouth       atorvastatin (LIPITOR) 80 MG tablet Take 80 mg by mouth       budesonide-formoterol (SYMBICORT)  160-4.5 MCG/ACT Inhaler Inhale 2 puffs into the lungs       clopidogrel (PLAVIX) 75 MG tablet Take 75 mg by mouth       famotidine (PEPCID) 20 MG tablet Take 20 mg by mouth       fluticasone (FLONASE) 50 MCG/ACT nasal spray Spray 2 sprays in nostril       HYDROcodone-acetaminophen (NORCO) 5-325 MG tablet Take 1 tablet by mouth       isosorbide mononitrate (IMDUR) 60 MG 24 hr tablet Take 60 mg by mouth       Lacosamide (VIMPAT) 100 MG TABS tablet Take 1/2 tab in the morning and 1 tab in the evening 45 tablet 5     levETIRAcetam (KEPPRA) 250 MG tablet Take 1 tablet (250 mg) by mouth 2 times daily 60 tablet 2     metoprolol succinate ER (TOPROL-XL) 100 MG 24 hr tablet Take 50 mg by mouth       nitroGLYcerin (NITROSTAT) 0.4 MG sublingual tablet Place 0.4 mg under the tongue       sodium chloride (OCEAN) 0.65 % nasal spray Spray 2 sprays in nostril       terazosin (HYTRIN) 10 MG capsule Take 1 capsule by mouth       Review of Systems:    Lethargy / Tiredness:  No  Sleepiness:  No  Nausea / Vomiting:  No  Blurred Vision:  Yes  Double Vision:  No  Depression:  No  Anxiety: Yes  Memory Problems:  Yes  Poor Balance:  Yes  Dizziness:  Yes  Appetite Changes:  No  Sleep Changes:  No  Headache: yes  Behavioral Changes:  No  Rash: negative  Respiratory: No shortness of breath and No cough  Cardiovascular: negative  Have you experienced a traumatic fall related to your events: No    Exam:    /59 (BP Location: Left arm, Patient Position: Sitting, Cuff Size: Adult Regular)   Pulse 72   Wt 82.3 kg (181 lb 8 oz)   SpO2 97%      Wt Readings from Last 5 Encounters:   03/13/20 82.3 kg (181 lb 8 oz)   11/11/19 82 kg (180 lb 12.8 oz)   09/23/19 79.8 kg (176 lb)     General Appearance: alert, awake, cooperative, NAD  NEUROLOGICAL EXAM:  Gait: slowly walks with a walker   Language/speech: no aphasia or dysarthria  Extraocular Movements:  intact  Coordination: intact  Facial Strength:  Left eyelid drooping  Tongue Strength:   Normal  Limb Strength:  Normal tone, bulk and strength 5/5, missing fingers in right hand  Limb Sensation:  Decreased sensation to light touch RLE    Assessment/Plan:    Focal epilepsy: h/o right occipital stroke, right vertebral artery stenosis, s/p stent, significant left vertebral artery stenosis. He did not any seizures since started on ASD. Sleepiness and imbalance is better off Keppra and a on lower dose of Vimpat. Will obtain Vimpat level for efficacy.     - Continue Vimpat     - Obtain Vimpat level     - RTC in 6 months      As described above, I met with the patient for 20 minutes and during this time counseling was greater than 50% of the visit time.  Velma Swann MD

## 2020-03-16 ENCOUNTER — TRANSFERRED RECORDS (OUTPATIENT)
Dept: HEALTH INFORMATION MANAGEMENT | Facility: CLINIC | Age: 85
End: 2020-03-16

## 2020-03-16 LAB
ALT SERPL-CCNC: 22 U/L (ref 0–55)
AST SERPL-CCNC: 20 U/L (ref 5–40)
CHOLEST SERPL-MCNC: 125 MG/DL
CREAT SERPL-MCNC: 1.1 MG/DL (ref 0.5–1.5)
GFR SERPL CREATININE-BSD FRML MDRD: >60 ML/MIN/1.73M2
GLUCOSE SERPL-MCNC: 96 MG/DL (ref 70–180)
HDLC SERPL-MCNC: 55 MG/DL
LDLC SERPL CALC-MCNC: 45 MG/DL
POTASSIUM SERPL-SCNC: 3.9 MMOL/L (ref 3.5–5)
TRIGL SERPL-MCNC: 125 MG/DL

## 2020-04-06 ENCOUNTER — TELEPHONE (OUTPATIENT)
Dept: NEUROLOGY | Facility: CLINIC | Age: 85
End: 2020-04-06

## 2020-04-06 NOTE — TELEPHONE ENCOUNTER
Health Call Center    Phone Message    May a detailed message be left on voicemail: yes     Reason for Call: Rozina from the [facility] called wanting to discuss if labs were done at the patient's last visit and if you wanted their most recent labs. Please advise. Thank you.    Action Taken: Message routed to:  Adult Clinics: Neurology p [id number]    Travel Screening: Not Applicable

## 2020-04-06 NOTE — TELEPHONE ENCOUNTER
[facility] called the [facility] and spoke to Rozina. She wasn't sure if we had received the Lacosamide level that was drawn on 3/10/20. After review of the chart, it looks like Dr Núñez ordered a level back on 3/13/20 but it was never drawn.   She did indicate that the results of the Lacosamide level drawn on 3/10/20 was 5.2.   She will have these results faxed to the clinic so we can get them entered into his chart.  Encounter routed to Dr Núñez as an FYI.  Maggy Galeana, RNCC  Neurology

## 2022-02-14 NOTE — PROGRESS NOTES
NEUROLOGY PROGRESS NOTE   [facility]    Patient:Davie Ayoub  : 1929  Age: 89 year old  Today's Office Visit: 2020    Background History:     Davie Ayoub is a 89yo M w/ pmh co-mobidties including prior CVA, L vertebral stenosis and R vertebral stent who was last seen on 2019 for episodes of garbled, dysarthric speech for ~10yrs, last  up to a few hours and fully resolve. He has had previous workup w/ EEGs showing R posterior slowing and most recent study revealed epileptiform discharges from L temporal lobe. Given  EEG abnormalities and ambiguity in history, patient was considered at risk for seizures and therefore was to continue on ASDs. Keppra caused too much sleepiness, so it was discontinued. Please see previous notes for more information.     History of present illness:     Neal is accompanied by his wife for a follow up on his seizures. He is taking lacosamide 50 mg am and 100 mg pm. He was very sleepy and had imbalance on Vimpat 100 mg bid. They did not notice any seizures since last visit in 2019. His wife says he dreams and talks and moves around in his sleep. He wakes up afterward and when she talks to him, he speaks fine and is not confused.    He saw his cardiologist yesterday and was told everything looked good.     Current Outpatient Medications   Medication Sig Dispense Refill     acetaminophen (TYLENOL) 500 MG tablet Take 500-1,000 mg by mouth       albuterol (PROVENTIL) (2.5 MG/3ML) 0.083% neb solution Inhale 2.5 mg into the lungs       amLODIPine (NORVASC) 10 MG tablet Take 10 mg by mouth       aspirin 81 MG EC tablet Take 81 mg by mouth       atorvastatin (LIPITOR) 80 MG tablet Take 80 mg by mouth       budesonide-formoterol (SYMBICORT) 160-4.5 MCG/ACT Inhaler Inhale 2 puffs into the lungs       clopidogrel (PLAVIX) 75 MG tablet Take 75 mg by mouth       famotidine (PEPCID) 20 MG tablet Take 20 mg by mouth       fluticasone (FLONASE) 50 MCG/ACT nasal spray Spray 2  sprays in nostril       HYDROcodone-acetaminophen (NORCO) 5-325 MG tablet Take 1 tablet by mouth       isosorbide mononitrate (IMDUR) 60 MG 24 hr tablet Take 60 mg by mouth       Lacosamide (VIMPAT) 100 MG TABS tablet Take 1/2 tab in the morning and 1 tab in the evening 45 tablet 5     levETIRAcetam (KEPPRA) 250 MG tablet Take 1 tablet (250 mg) by mouth 2 times daily 60 tablet 2     metoprolol succinate ER (TOPROL-XL) 100 MG 24 hr tablet Take 50 mg by mouth       nitroGLYcerin (NITROSTAT) 0.4 MG sublingual tablet Place 0.4 mg under the tongue       sodium chloride (OCEAN) 0.65 % nasal spray Spray 2 sprays in nostril       terazosin (HYTRIN) 10 MG capsule Take 1 capsule by mouth       Review of Systems:    Lethargy / Tiredness:  No  Sleepiness:  No  Nausea / Vomiting:  No  Blurred Vision:  Yes  Double Vision:  No  Depression:  No  Anxiety: Yes  Memory Problems:  Yes  Poor Balance:  Yes  Dizziness:  Yes  Appetite Changes:  No  Sleep Changes:  No  Headache: yes  Behavioral Changes:  No  Rash: negative  Respiratory: No shortness of breath and No cough  Cardiovascular: negative  Have you experienced a traumatic fall related to your events: No    Exam:    /59 (BP Location: Left arm, Patient Position: Sitting, Cuff Size: Adult Regular)   Pulse 72   Wt 82.3 kg (181 lb 8 oz)   SpO2 97%      Wt Readings from Last 5 Encounters:   03/13/20 82.3 kg (181 lb 8 oz)   11/11/19 82 kg (180 lb 12.8 oz)   09/23/19 79.8 kg (176 lb)     General Appearance: alert, awake, cooperative, NAD  NEUROLOGICAL EXAM:  Gait: slowly walks with a walker   Language/speech: no aphasia or dysarthria  Extraocular Movements:  intact  Coordination: intact  Facial Strength:  Left eyelid drooping  Tongue Strength:  Normal  Limb Strength:  Normal tone, bulk and strength 5/5, missing fingers in right hand  Limb Sensation:  Decreased sensation to light touch RLE    Assessment/Plan:    Focal epilepsy: h/o right occipital stroke, right vertebral artery  stenosis, s/p stent, significant left vertebral artery stenosis. He did not any seizures since started on ASD. Sleepiness and imbalance is better off Keppra and a on lower dose of Vimpat. Will obtain Vimpat level for efficacy.     - Continue Vimpat     - Obtain Vimpat level     - RTC in 6 months      As described above, I met with the patient for 20 minutes and during this time counseling was greater than 50% of the visit time.  Velma Swann MD     Opt out